# Patient Record
(demographics unavailable — no encounter records)

---

## 2024-10-15 NOTE — HISTORY OF PRESENT ILLNESS
[de-identified] : The patient is a 59 year  old right hand dominant female who presents today complaining of left shoulder pain.   Date of Injury/Onset: 8/15/23 Pain:    At Rest: 3/10  With Activity:  5/10  Mechanism of injury: started experiencing pain after doing upper body exercises at the gym This is not a Work Related Injury being treated under Worker's Compensation. This is not an athletic injury occurring associated with an interscholastic or organized sports team. 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 She also states that pain has improved since PT tx, she only required Naproxen 3 days and has not taken it since.  Additional Information: Currently taking amoxicillin and omeprazol for H pilory tx

## 2024-10-15 NOTE — HISTORY OF PRESENT ILLNESS
[de-identified] : The patient is a 59 year  old right hand dominant female who presents today complaining of left shoulder pain.   Date of Injury/Onset: 8/15/23 Pain:    At Rest: 3/10  With Activity:  5/10  Mechanism of injury: started experiencing pain after doing upper body exercises at the gym This is not a Work Related Injury being treated under Worker's Compensation. This is not an athletic injury occurring associated with an interscholastic or organized sports team. 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 She also states that pain has improved since PT tx, she only required Naproxen 3 days and has not taken it since.  Additional Information: Currently taking amoxicillin and omeprazol for H pilory tx

## 2024-10-15 NOTE — HISTORY OF PRESENT ILLNESS
[FreeTextEntry1] : 58yo female with refractory hpylori  She has refractory hpylori and also has had issues tolerating hpylori regimens  She now on amoxicillin rifabutin, omeprazole for a few days and tolerating relatively well She had minor itching but no rash

## 2024-10-15 NOTE — IMAGING
[de-identified] : The patient is a well appearing 58 year  old female of their stated age.  Neck is supple & nontender to palpation. Negative Spurling's test.   Effected Shoulder: LEFT  Inspection:  Scapula Winging: Negative  Deformity: None  Erythema: None  Ecchymosis: None  Abrasions: None  Effusion: None   Range of Motion:  Active Forward Flexion: 170 degrees   Active Abduction: 170 degrees   Passive Forward Flexion: 170 degrees   Passive Abduction: 170 degrees   ER @ 90 degrees: 90 degrees  IR @ 90 degrees: 15 degrees WITH PAIN ER @ 0 degrees: 30 degrees  Motor Exam:   Forward Flexion: 5- out of 5   Flexion Plane of Scapula: 5- out of 5  Abduction: 5- out of 5  Internal Rotation: 5- out of 5  External Rotation: 5-- out of 5  Distal Motor Strength: 5 out of 5   Stability Testing:  Anterior: 1+  Posterior: 1+  Sulcus N: 1+  Sulcus ER: 1+  Provocative Tests:  Drop Arm: Negative  Impingement: POSITIVE  [place]: Negative  X-Arm Adduction: Negative  Belly Press: Negative  Bear Hug: Negative  Lift Off: Negative  Apprehension: Negative  Relocation: Negative  Posterior Load & Shift: Negative   Palpation:  AC Joint: Nontender  Clavicle: Nontender  SC Joint: Nontender  Bicepital Groove: Nontender  Coracoid Process: Nontender  Pectoralis Minor Tendon: Nontender  Pectoralis Major Tendon: Nontender & palpably intact  Latissimus Dorsi: Nontender   Proximal Humerus: Nontender  Scapula Body: Nontender  Medial Scapula Boarder: Nontender  Scapula Spine: Nontender   Neurologic Exam: Sensation to Light Touch:  Axillary: Grossly intact  Ulnar: Grossly intact  Radial: Grossly intact  Median: Grossly intact  Other:  N/A  Circulatory/Pulses:  Ulnar: 2+  Radial: 2+  Other Pertinent Findings: None   Contralateral Shoulder  Range of Motion:  Active Forward Flexion: 180 degrees   Active Abduction: 180 degrees   Passive Forward Flexion: 180 degrees  Passive Abduction: 180 degrees   ER @ 90 degrees: 90 degrees  IR @ 90 degrees: 45 degrees  ER @ 0 degrees: 50 degrees  Motor Exam:  Forward Flexion: 5 out of 5  Flexion Plane of Scapula: 5 out of 5  Abduction: 5 out of 5  Internal Rotation: 5 out of 5  External Rotation: 5 out of 5  Distal Motor Strength: 5 out of 5  Stability Testing:  Anterior: 1+  Posterior: 1+  Sulcus N: 1+  Sulcus ER: 1+  Other Pertinent Findings: None   Assessment: The patient is a 58 year old female with left shoulder pain and radiographic and physical exam findings consistent with calcific tendonitis and partial rotator cuff tear.    The patients condition is acute  Documents/Results Reviewed Today: None   Tests/Studies Independently Interpreted Today: None  Pertinent findings include:  170/170/90/15/30. 5-/5 throughout, +impingement Confounding medical conditions/concerns: None  Plan: The patient reports gradual, interval improvement in pain and mechanical symptoms related to calcific tendonitis and partial rotator cuff tear through formal rehab and oral NSAIDs. She decides to defer any operative management and will continue with current course. If any pain is worsening, she may consider repeat injection vs arthroscopy. In the interim, continue physical therapy, HEP, and stretching. Continue use of previously prescribed Naproxen 500mg for pain management and inflammation - use as directed and take with food. Modify activity as discussed.  Tests Ordered: None   Prescription Medications Ordered: Continue Naproxen 500mg  Braces/DME Ordered: None  Activity/Work/Sports Status: Limit heavy lifting, as tolerated  Additional Instructions: None Follow-Up: 4 weeks   The patient's current medication management of their orthopedic diagnosis was reviewed today: (1) The patient will continue with the PRN use of their prescribed anti-inflammatory. (2) There is a moderate risk of morbidity with further treatment, especially from use of prescription strength medications and possible side effects of these medications which include upset stomach with oral medications, skin reactions to topical medications and cardiac/renal issues with long term use. (3) I recommended that the patient follow-up with their medical physician to discuss any significant specific potential issues with long term medication use such as interactions with current medications or with exacerbation of underlying medical comorbidities. (4) The benefits and risks associated with use of injectable, oral or topical, prescription and over the counter anti-inflammatory medications were discussed with the patient. The patient voiced understanding of the risks including but not limited to bleeding, stroke, kidney dysfunction, heart disease, and were referred to the black box warning label for further information.   IHemalatha attest that this documentation has been prepared under the direction and in the presence of Provider Dr. Riki Mccormack.   The documentation recorded by the scribe accurately reflects the service JAMES Metzger PA-C personally performed and the decisions made by me. The patient was seen by me under the direct supervision of Dr. Riki Mccormack

## 2024-10-15 NOTE — ASSESSMENT
[FreeTextEntry1] : 58yo female with refractory hpylori  will complete course of current therapy will then check stool for hpylori

## 2024-10-15 NOTE — IMAGING
[de-identified] : The patient is a well appearing 58 year  old female of their stated age.  Neck is supple & nontender to palpation. Negative Spurling's test.   Effected Shoulder: LEFT  Inspection:  Scapula Winging: Negative  Deformity: None  Erythema: None  Ecchymosis: None  Abrasions: None  Effusion: None   Range of Motion:  Active Forward Flexion: 170 degrees   Active Abduction: 170 degrees   Passive Forward Flexion: 170 degrees   Passive Abduction: 170 degrees   ER @ 90 degrees: 90 degrees  IR @ 90 degrees: 15 degrees WITH PAIN ER @ 0 degrees: 30 degrees  Motor Exam:   Forward Flexion: 5- out of 5   Flexion Plane of Scapula: 5- out of 5  Abduction: 5- out of 5  Internal Rotation: 5- out of 5  External Rotation: 5-- out of 5  Distal Motor Strength: 5 out of 5   Stability Testing:  Anterior: 1+  Posterior: 1+  Sulcus N: 1+  Sulcus ER: 1+  Provocative Tests:  Drop Arm: Negative  Impingement: POSITIVE  [place]: Negative  X-Arm Adduction: Negative  Belly Press: Negative  Bear Hug: Negative  Lift Off: Negative  Apprehension: Negative  Relocation: Negative  Posterior Load & Shift: Negative   Palpation:  AC Joint: Nontender  Clavicle: Nontender  SC Joint: Nontender  Bicepital Groove: Nontender  Coracoid Process: Nontender  Pectoralis Minor Tendon: Nontender  Pectoralis Major Tendon: Nontender & palpably intact  Latissimus Dorsi: Nontender   Proximal Humerus: Nontender  Scapula Body: Nontender  Medial Scapula Boarder: Nontender  Scapula Spine: Nontender   Neurologic Exam: Sensation to Light Touch:  Axillary: Grossly intact  Ulnar: Grossly intact  Radial: Grossly intact  Median: Grossly intact  Other:  N/A  Circulatory/Pulses:  Ulnar: 2+  Radial: 2+  Other Pertinent Findings: None   Contralateral Shoulder  Range of Motion:  Active Forward Flexion: 180 degrees   Active Abduction: 180 degrees   Passive Forward Flexion: 180 degrees  Passive Abduction: 180 degrees   ER @ 90 degrees: 90 degrees  IR @ 90 degrees: 45 degrees  ER @ 0 degrees: 50 degrees  Motor Exam:  Forward Flexion: 5 out of 5  Flexion Plane of Scapula: 5 out of 5  Abduction: 5 out of 5  Internal Rotation: 5 out of 5  External Rotation: 5 out of 5  Distal Motor Strength: 5 out of 5  Stability Testing:  Anterior: 1+  Posterior: 1+  Sulcus N: 1+  Sulcus ER: 1+  Other Pertinent Findings: None   Assessment: The patient is a 58 year old female with left shoulder pain and radiographic and physical exam findings consistent with calcific tendonitis and partial rotator cuff tear.    The patients condition is acute  Documents/Results Reviewed Today: None   Tests/Studies Independently Interpreted Today: None  Pertinent findings include:  170/170/90/15/30. 5-/5 throughout, +impingement Confounding medical conditions/concerns: None  Plan: The patient reports gradual, interval improvement in pain and mechanical symptoms related to calcific tendonitis and partial rotator cuff tear through formal rehab and oral NSAIDs. She decides to defer any operative management and will continue with current course. If any pain is worsening, she may consider repeat injection vs arthroscopy. In the interim, continue physical therapy, HEP, and stretching. Continue use of previously prescribed Naproxen 500mg for pain management and inflammation - use as directed and take with food. Modify activity as discussed.  Tests Ordered: None   Prescription Medications Ordered: Continue Naproxen 500mg  Braces/DME Ordered: None  Activity/Work/Sports Status: Limit heavy lifting, as tolerated  Additional Instructions: None Follow-Up: 4 weeks   The patient's current medication management of their orthopedic diagnosis was reviewed today: (1) The patient will continue with the PRN use of their prescribed anti-inflammatory. (2) There is a moderate risk of morbidity with further treatment, especially from use of prescription strength medications and possible side effects of these medications which include upset stomach with oral medications, skin reactions to topical medications and cardiac/renal issues with long term use. (3) I recommended that the patient follow-up with their medical physician to discuss any significant specific potential issues with long term medication use such as interactions with current medications or with exacerbation of underlying medical comorbidities. (4) The benefits and risks associated with use of injectable, oral or topical, prescription and over the counter anti-inflammatory medications were discussed with the patient. The patient voiced understanding of the risks including but not limited to bleeding, stroke, kidney dysfunction, heart disease, and were referred to the black box warning label for further information.   IHemalatha attest that this documentation has been prepared under the direction and in the presence of Provider Dr. Riki Mccormack.   The documentation recorded by the scribe accurately reflects the service JAMES Metzger PA-C personally performed and the decisions made by me. The patient was seen by me under the direct supervision of Dr. Riki Mccormack

## 2024-11-26 NOTE — HISTORY OF PRESENT ILLNESS
[de-identified] : The patient is a 59 year  old right hand dominant female who presents today complaining of left shoulder pain.   Date of Injury/Onset: 8/15/23 Pain:    At Rest: 0/10  With Activity:  5/10  Mechanism of injury: started experiencing pain after doing upper body exercises at the gym This is not a Work Related Injury being treated under Worker's Compensation. This is not an athletic injury occurring associated with an interscholastic or organized sports team. 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 Some pain with OH motions, lateral raises. In PT 1-2x/week. Not interested in CSI.  Additional Information: Currently taking amoxicillin and omeprazol for H angelory tx

## 2024-11-26 NOTE — IMAGING
[de-identified] : The patient is a well appearing 59 year old female of their stated age.  Neck is supple & nontender to palpation. Negative Spurling's test.   Effected Shoulder: LEFT  Inspection:  Scapula Winging: Negative  Deformity: None  Erythema: None  Ecchymosis: None  Abrasions: None  Effusion: None   Range of Motion:  Active Forward Flexion: 180 degrees   Active Abduction: 180 degrees   Passive Forward Flexion: 180 degrees   Passive Abduction: 180 degrees   ER @ 90 degrees: 90 degrees  IR @ 90 degrees: 0 degrees WITH PAIN ER @ 0 degrees: 30 degrees  Motor Exam:   Forward Flexion: 5- out of 5   Flexion Plane of Scapula: 5- out of 5  Abduction: 5- out of 5  Internal Rotation: 5- out of 5  External Rotation: 5- out of 5  Distal Motor Strength: 5 out of 5   Stability Testing:  Anterior: 1+  Posterior: 1+  Sulcus N: 1+  Sulcus ER: 1+  Provocative Tests:  Drop Arm: Negative  Impingement: POSITIVE  [place]: Negative  X-Arm Adduction: Negative  Belly Press: Negative  Bear Hug: Negative  Lift Off: Negative  Apprehension: Negative  Relocation: Negative  Posterior Load & Shift: Negative   Palpation:  AC Joint: Nontender  Clavicle: Nontender  SC Joint: Nontender  Bicepital Groove: Nontender  Coracoid Process: Nontender  Pectoralis Minor Tendon: Nontender  Pectoralis Major Tendon: Nontender & palpably intact  Latissimus Dorsi: Nontender   Proximal Humerus: Nontender  Scapula Body: Nontender  Medial Scapula Boarder: Nontender  Scapula Spine: Nontender   Neurologic Exam: Sensation to Light Touch:  Axillary: Grossly intact  Ulnar: Grossly intact  Radial: Grossly intact  Median: Grossly intact  Other:  N/A  Circulatory/Pulses:  Ulnar: 2+  Radial: 2+  Other Pertinent Findings: None   Contralateral Shoulder  Range of Motion:  Active Forward Flexion: 180 degrees   Active Abduction: 180 degrees   Passive Forward Flexion: 180 degrees  Passive Abduction: 180 degrees   ER @ 90 degrees: 90 degrees  IR @ 90 degrees: 45 degrees  ER @ 0 degrees: 50 degrees  Motor Exam:  Forward Flexion: 5 out of 5  Flexion Plane of Scapula: 5 out of 5  Abduction: 5 out of 5  Internal Rotation: 5 out of 5  External Rotation: 5 out of 5  Distal Motor Strength: 5 out of 5  Stability Testing:  Anterior: 1+  Posterior: 1+  Sulcus N: 1+  Sulcus ER: 1+  Other Pertinent Findings: None   Assessment: The patient is a 59 year old female with left shoulder pain and radiographic and physical exam findings consistent with calcific tendonitis and partial rotator cuff tear.    The patients condition is acute  Documents/Results Reviewed Today: None   Tests/Studies Independently Interpreted Today: None  Pertinent findings include: 180/180/90/0/30. 5-/5 throughout, +impingement Confounding medical conditions/concerns: None  Plan: Discussed all possible treatment options for calcific tendonitis and partial rotator cuff tearing, both operative vs non-operative. Although the patient has been unable to get into formal physical therapy secondary to social life complications, we recommended she remain compliant with HEP to avoid growing stiff. Continue physical therapy, get in as much as she can. Work on strengthening within the range that is comfortable to her. Discussed the benefits of subacromial CSI however, the patient declines. Continue use of previously prescribed Naproxen 500mg for pain management and inflammation - use as directed and take with food. If her range of motion does not improve, she should consider a CSI. Modify activity as discussed. Tests Ordered: None   Prescription Medications Ordered: Continue Naproxen 500mg  Braces/DME Ordered: None  Activity/Work/Sports Status: Limit heavy lifting, as tolerated  Additional Instructions: None Follow-Up: 4 weeks   The patient's current medication management of their orthopedic diagnosis was reviewed today: (1) The patient will continue with the PRN use of their prescribed anti-inflammatory. (2) There is a moderate risk of morbidity with further treatment, especially from use of prescription strength medications and possible side effects of these medications which include upset stomach with oral medications, skin reactions to topical medications and cardiac/renal issues with long term use. (3) I recommended that the patient follow-up with their medical physician to discuss any significant specific potential issues with long term medication use such as interactions with current medications or with exacerbation of underlying medical comorbidities. (4) The benefits and risks associated with use of injectable, oral or topical, prescription and over the counter anti-inflammatory medications were discussed with the patient. The patient voiced understanding of the risks including but not limited to bleeding, stroke, kidney dysfunction, heart disease, and were referred to the black box warning label for further information.   IHemalatha attest that this documentation has been prepared under the direction and in the presence of Provider Dr. Riki Mccormack.   The documentation recorded by the scribe accurately reflects the service I Behzad Metzger PA-C personally performed and the decisions made by me. The patient was seen by me under the direct supervision of Dr. Riki Mccormack

## 2024-11-26 NOTE — IMAGING
[de-identified] : The patient is a well appearing 59 year old female of their stated age.  Neck is supple & nontender to palpation. Negative Spurling's test.   Effected Shoulder: LEFT  Inspection:  Scapula Winging: Negative  Deformity: None  Erythema: None  Ecchymosis: None  Abrasions: None  Effusion: None   Range of Motion:  Active Forward Flexion: 180 degrees   Active Abduction: 180 degrees   Passive Forward Flexion: 180 degrees   Passive Abduction: 180 degrees   ER @ 90 degrees: 90 degrees  IR @ 90 degrees: 0 degrees WITH PAIN ER @ 0 degrees: 30 degrees  Motor Exam:   Forward Flexion: 5- out of 5   Flexion Plane of Scapula: 5- out of 5  Abduction: 5- out of 5  Internal Rotation: 5- out of 5  External Rotation: 5- out of 5  Distal Motor Strength: 5 out of 5   Stability Testing:  Anterior: 1+  Posterior: 1+  Sulcus N: 1+  Sulcus ER: 1+  Provocative Tests:  Drop Arm: Negative  Impingement: POSITIVE  [place]: Negative  X-Arm Adduction: Negative  Belly Press: Negative  Bear Hug: Negative  Lift Off: Negative  Apprehension: Negative  Relocation: Negative  Posterior Load & Shift: Negative   Palpation:  AC Joint: Nontender  Clavicle: Nontender  SC Joint: Nontender  Bicepital Groove: Nontender  Coracoid Process: Nontender  Pectoralis Minor Tendon: Nontender  Pectoralis Major Tendon: Nontender & palpably intact  Latissimus Dorsi: Nontender   Proximal Humerus: Nontender  Scapula Body: Nontender  Medial Scapula Boarder: Nontender  Scapula Spine: Nontender   Neurologic Exam: Sensation to Light Touch:  Axillary: Grossly intact  Ulnar: Grossly intact  Radial: Grossly intact  Median: Grossly intact  Other:  N/A  Circulatory/Pulses:  Ulnar: 2+  Radial: 2+  Other Pertinent Findings: None   Contralateral Shoulder  Range of Motion:  Active Forward Flexion: 180 degrees   Active Abduction: 180 degrees   Passive Forward Flexion: 180 degrees  Passive Abduction: 180 degrees   ER @ 90 degrees: 90 degrees  IR @ 90 degrees: 45 degrees  ER @ 0 degrees: 50 degrees  Motor Exam:  Forward Flexion: 5 out of 5  Flexion Plane of Scapula: 5 out of 5  Abduction: 5 out of 5  Internal Rotation: 5 out of 5  External Rotation: 5 out of 5  Distal Motor Strength: 5 out of 5  Stability Testing:  Anterior: 1+  Posterior: 1+  Sulcus N: 1+  Sulcus ER: 1+  Other Pertinent Findings: None   Assessment: The patient is a 59 year old female with left shoulder pain and radiographic and physical exam findings consistent with calcific tendonitis and partial rotator cuff tear.    The patients condition is acute  Documents/Results Reviewed Today: None   Tests/Studies Independently Interpreted Today: None  Pertinent findings include: 180/180/90/0/30. 5-/5 throughout, +impingement Confounding medical conditions/concerns: None  Plan: Discussed all possible treatment options for calcific tendonitis and partial rotator cuff tearing, both operative vs non-operative. Although the patient has been unable to get into formal physical therapy secondary to social life complications, we recommended she remain compliant with HEP to avoid growing stiff. Continue physical therapy, get in as much as she can. Work on strengthening within the range that is comfortable to her. Discussed the benefits of subacromial CSI however, the patient declines. Continue use of previously prescribed Naproxen 500mg for pain management and inflammation - use as directed and take with food. If her range of motion does not improve, she should consider a CSI. Modify activity as discussed. Tests Ordered: None   Prescription Medications Ordered: Continue Naproxen 500mg  Braces/DME Ordered: None  Activity/Work/Sports Status: Limit heavy lifting, as tolerated  Additional Instructions: None Follow-Up: 4 weeks   The patient's current medication management of their orthopedic diagnosis was reviewed today: (1) The patient will continue with the PRN use of their prescribed anti-inflammatory. (2) There is a moderate risk of morbidity with further treatment, especially from use of prescription strength medications and possible side effects of these medications which include upset stomach with oral medications, skin reactions to topical medications and cardiac/renal issues with long term use. (3) I recommended that the patient follow-up with their medical physician to discuss any significant specific potential issues with long term medication use such as interactions with current medications or with exacerbation of underlying medical comorbidities. (4) The benefits and risks associated with use of injectable, oral or topical, prescription and over the counter anti-inflammatory medications were discussed with the patient. The patient voiced understanding of the risks including but not limited to bleeding, stroke, kidney dysfunction, heart disease, and were referred to the black box warning label for further information.   IHemalatha attest that this documentation has been prepared under the direction and in the presence of Provider Dr. Riki Mccormack.   The documentation recorded by the scribe accurately reflects the service I Behzad Metzger PA-C personally performed and the decisions made by me. The patient was seen by me under the direct supervision of Dr. Riki Mccormack

## 2024-11-26 NOTE — HISTORY OF PRESENT ILLNESS
[de-identified] : The patient is a 59 year  old right hand dominant female who presents today complaining of left shoulder pain.   Date of Injury/Onset: 8/15/23 Pain:    At Rest: 0/10  With Activity:  5/10  Mechanism of injury: started experiencing pain after doing upper body exercises at the gym This is not a Work Related Injury being treated under Worker's Compensation. This is not an athletic injury occurring associated with an interscholastic or organized sports team. 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 Some pain with OH motions, lateral raises. In PT 1-2x/week. Not interested in CSI.  Additional Information: Currently taking amoxicillin and omeprazol for H angelory tx

## 2025-01-06 NOTE — HISTORY OF PRESENT ILLNESS
[de-identified] : The patient is a 59 year  old right hand dominant female who presents today complaining of left shoulder pain.   Date of Injury/Onset: 8/15/23 Pain:    At Rest: 0/10  With Activity:  2/10  Mechanism of injury: started experiencing pain after doing upper body exercises at the gym This is not a Work Related Injury being treated under Worker's Compensation. This is not an athletic injury occurring associated with an interscholastic or organized sports team. 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 Some pain with OH motions, lateral raises. In PT 1-2x/week. Not interested in CSI.  Additional Information: Currently taking amoxicillin and omeprazol for H angelory tx

## 2025-01-06 NOTE — IMAGING
[de-identified] : The patient is a well appearing 59 year old female of their stated age.  Neck is supple & nontender to palpation. Negative Spurling's test.   Effected Shoulder: LEFT  Inspection:  Scapula Winging: Negative  Deformity: None  Erythema: None  Ecchymosis: None  Abrasions: None  Effusion: None   Range of Motion:  Active Forward Flexion: 180 degrees   Active Abduction: 180 degrees   Passive Forward Flexion: 180 degrees   Passive Abduction: 180 degrees   ER @ 90 degrees: 90 degrees  IR @ 90 degrees: 0 degrees WITH PAIN ER @ 0 degrees: 30 degrees  Motor Exam:   Forward Flexion: 5- out of 5   Flexion Plane of Scapula: 5- out of 5  Abduction: 5- out of 5  Internal Rotation: 5 out of 5  External Rotation: 5 out of 5  Distal Motor Strength: 5 out of 5   Stability Testing:  Anterior: 1+  Posterior: 1+  Sulcus N: 1+  Sulcus ER: 1+  Provocative Tests:  Drop Arm: Negative  Impingement: POSITIVE  [place]: Negative  X-Arm Adduction: Negative  Belly Press: Negative  Bear Hug: Negative  Lift Off: Negative  Apprehension: Negative  Relocation: Negative  Posterior Load & Shift: Negative   Palpation:  AC Joint: Nontender  Clavicle: Nontender  SC Joint: Nontender  Bicepital Groove: Nontender  Coracoid Process: Nontender  Pectoralis Minor Tendon: Nontender  Pectoralis Major Tendon: Nontender & palpably intact  Latissimus Dorsi: Nontender   Proximal Humerus: Nontender  Scapula Body: Nontender  Medial Scapula Boarder: Nontender  Scapula Spine: Nontender   Neurologic Exam: Sensation to Light Touch:  Axillary: Grossly intact  Ulnar: Grossly intact  Radial: Grossly intact  Median: Grossly intact  Other:  N/A  Circulatory/Pulses:  Ulnar: 2+  Radial: 2+  Other Pertinent Findings: None   Contralateral Shoulder  Range of Motion:  Active Forward Flexion: 180 degrees   Active Abduction: 180 degrees   Passive Forward Flexion: 180 degrees  Passive Abduction: 180 degrees   ER @ 90 degrees: 90 degrees  IR @ 90 degrees: 45 degrees  ER @ 0 degrees: 50 degrees  Motor Exam:  Forward Flexion: 5 out of 5  Flexion Plane of Scapula: 5 out of 5  Abduction: 5 out of 5  Internal Rotation: 5 out of 5  External Rotation: 5 out of 5  Distal Motor Strength: 5 out of 5  Stability Testing:  Anterior: 1+  Posterior: 1+  Sulcus N: 1+  Sulcus ER: 1+  Other Pertinent Findings: None   Assessment: The patient is a 59 year old female with left shoulder pain and radiographic and physical exam findings consistent with calcific tendonitis and partial rotator cuff tear.    The patients condition is acute  Documents/Results Reviewed Today: None   Tests/Studies Independently Interpreted Today: None  Pertinent findings include: 180/180/90/0/30. 5-/5 ABD, FF, and FSP, 5/5 IR & ER, +impingement Confounding medical conditions/concerns: None  Plan: Although the patient's condition has slightly improved, she presents with residual symptomology consistent with calcific tendonitis and impingement. At this time, her rotator cuff strength is intact and discomfort appears more related to calcific tendinopathy. Again, we discussed all possible treatment options for calcific tendonitis and partial rotator cuff tearing, both operative vs non-operative. Continue physical therapy. We discussed the benefits of a subacromial CSI to cleave inflammatory process however, the patient decides to defer. We recommended she consider if no better in 6 weeks. Continue use of previously prescribed Naproxen 500mg for pain management and inflammation - use as directed and take with food. Modify activity as discussed. Tests Ordered: None   Prescription Medications Ordered: Continue PRN use of Naproxen 500mg  Braces/DME Ordered: None  Activity/Work/Sports Status: As tolerated  Additional Instructions: None Follow-Up: 4-6 weeks   The patient's current medication management of their orthopedic diagnosis was reviewed today: (1) The patient will continue with the PRN use of their prescribed anti-inflammatory. (2) There is a moderate risk of morbidity with further treatment, especially from use of prescription strength medications and possible side effects of these medications which include upset stomach with oral medications, skin reactions to topical medications and cardiac/renal issues with long term use. (3) I recommended that the patient follow-up with their medical physician to discuss any significant specific potential issues with long term medication use such as interactions with current medications or with exacerbation of underlying medical comorbidities. (4) The benefits and risks associated with use of injectable, oral or topical, prescription and over the counter anti-inflammatory medications were discussed with the patient. The patient voiced understanding of the risks including but not limited to bleeding, stroke, kidney dysfunction, heart disease, and were referred to the black box warning label for further information.   I, Hemalatha Lucia attest that this documentation has been prepared under the direction and in the presence of Provider Dr. Riki Mccormack.   The documentation recorded by the scribe accurately reflects the service I Behzad Metzger PA-C personally performed and the decisions made by me. The patient was seen by me under the direct supervision of Dr. Riki Mccormack

## 2025-01-19 NOTE — PHYSICAL EXAM
[Alert] : alert [Normal Voice/Communication] : normal voice/communication [Healthy Appearing] : healthy appearing [No Acute Distress] : no acute distress [Sclera] : the sclera and conjunctiva were normal [Hearing Threshold Finger Rub Not [place]] : hearing was normal [Normal Lips/Gums] : the lips and gums were normal [Oropharynx] : the oropharynx was normal [Normal Appearance] : the appearance of the neck was normal [No Neck Mass] : no neck mass was observed [No Respiratory Distress] : no respiratory distress [No Acc Muscle Use] : no accessory muscle use [Respiration, Rhythm And Depth] : normal respiratory rhythm and effort [Auscultation Breath Sounds / Voice Sounds] : lungs were clear to auscultation bilaterally [Heart Rate And Rhythm] : heart rate was normal and rhythm regular [Normal S1, S2] : normal S1 and S2 [Murmurs] : no murmurs [Bowel Sounds] : normal bowel sounds [Abdomen Tenderness] : non-tender [No Masses] : no abdominal mass palpated [Abdomen Soft] : soft [] : no hepatosplenomegaly [Oriented To Time, Place, And Person] : oriented to person, place, and time

## 2025-01-19 NOTE — ASSESSMENT
[FreeTextEntry1] : 60yo female with hx hpylori infection, also with dilated CBD  outside imaging reviewed ultrasound with 8 mm CBD will check repeat sono to see if persistent or larger check MRCP if CBD larger  will speak to lab to see how to get media for EGD with biopsy to do c and s for hpylori will check egd

## 2025-01-19 NOTE — HISTORY OF PRESENT ILLNESS
[FreeTextEntry1] : 58yo female with hpylori  She has failed treatment - both ineffective in eliminating Hpylori but she also has difficulty tolerating several of the antibiotics, not true allergies but more intolerance  We have discussed checking EGD with culture - for financial reasons, she is unable to have done at  We have to explore how to get proper culture media for procedure outside   She has sono with 8mm CBD in the past and currently no focal RUQ pain

## 2025-01-19 NOTE — ASSESSMENT
[FreeTextEntry1] : 58yo female with hx hpylori infection, also with dilated CBD  outside imaging reviewed ultrasound with 8 mm CBD will check repeat sono to see if persistent or larger check MRCP if CBD larger  will speak to lab to see how to get media for EGD with biopsy to do c and s for hpylori will check egd

## 2025-01-19 NOTE — HISTORY OF PRESENT ILLNESS
[FreeTextEntry1] : 60yo female with hpylori  She has failed treatment - both ineffective in eliminating Hpylori but she also has difficulty tolerating several of the antibiotics, not true allergies but more intolerance  We have discussed checking EGD with culture - for financial reasons, she is unable to have done at  We have to explore how to get proper culture media for procedure outside   She has sono with 8mm CBD in the past and currently no focal RUQ pain

## 2025-02-10 NOTE — PROCEDURE
[de-identified] : Indication:  Unable to adequately examine nasal passages and sinus drainage with anterior rhinoscopy. The patient has nasal dryness slight nose bleeding  Scope # 47 Mild septal deviation is present on direct visualization on either side. Both inferior nasal turbinates are moderate in size with normal  appearing mucosa.  The sinus endoscope was introduced into the right nares exam right middle meatus reveals no mucopus, polyps or inflammation.  The middle turbinate is unremarkable. The scope was advanced and the sphenoethmoid region was inspected. The superior meatus and nasal vault are unremarkable.  The nasopharynx is unremarkable without inflammation or mass The sinus endoscope was introduced into the left nares exam of the left middle meatus reveals no mucopus, polyps or inflammation and the left middle turbinate is unremarkable. The scope was advanced and the sphenoethmoid region was inspected. The left superior meatus and nasal vault are unremarkable.

## 2025-02-10 NOTE — ASSESSMENT
[FreeTextEntry1] : rhinitis sicca audio au loss mild asymmetry f/u audio one y ponaris emollient hypertonic saline rinses

## 2025-02-10 NOTE — DATA REVIEWED
[de-identified] :  Type A tymps, AU. Testing via inserts: AD- WNL up to 1kHz, sloping to a mild / moderate SNHL, rising to WNL. AS- WNL up to 2kHz, sloping to a mild to moderate SNHL. Recs: 1) F/u w/ MD 2) Further tests as per MD 3) Hearing aids pending med clearance 4) Annual

## 2025-02-10 NOTE — HISTORY OF PRESENT ILLNESS
[de-identified] : 59 year old female presents for hearing/ sinus f/u Hx sinus inflammation- Nov 2024, Rx Amoxicillin and Prednisone States nasal dryness and pain in AM d/t cold weather Using nebulizer and saline spray Notices blood tinged mucous - yellow/green Nasal stuffiness when supine Also states hearing troubles in past 3 months Extra effort to hear properly No tinnitus, no vertigo spontaneous

## 2025-02-25 NOTE — HISTORY OF PRESENT ILLNESS
[de-identified] : The patient is a 60 year old right hand dominant female who presents today complaining of left shoulder pain.   Date of Injury/Onset: 8/15/23 Pain:    At Rest: 0/10  With Activity:  2/10  Mechanism of injury: started experiencing pain after doing upper body exercises at the gym This is not a Work Related Injury being treated under Worker's Compensation. This is not an athletic injury occurring associated with an interscholastic or organized sports team. 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 Still getting intermittent pinching pain with lateral movements and increased weighted rehab exercises.  Additional Information: Currently taking amoxicillin and omeprazol for H pilory tx

## 2025-02-25 NOTE — HISTORY OF PRESENT ILLNESS
[de-identified] : The patient is a 60 year old right hand dominant female who presents today complaining of left shoulder pain.   Date of Injury/Onset: 8/15/23 Pain:    At Rest: 0/10  With Activity:  2/10  Mechanism of injury: started experiencing pain after doing upper body exercises at the gym This is not a Work Related Injury being treated under Worker's Compensation. This is not an athletic injury occurring associated with an interscholastic or organized sports team. 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 Still getting intermittent pinching pain with lateral movements and increased weighted rehab exercises.  Additional Information: Currently taking amoxicillin and omeprazol for H pilory tx

## 2025-02-25 NOTE — IMAGING
[de-identified] : The patient is a well appearing 59 year old female of their stated age.  Neck is supple & nontender to palpation. Negative Spurling's test.   Effected Shoulder: LEFT  Inspection:  Scapula Winging: Negative  Deformity: None  Erythema: None  Ecchymosis: None  Abrasions: None  Effusion: None   Range of Motion:  Active Forward Flexion: 180 degrees   Active Abduction: 180 degrees   Passive Forward Flexion: 180 degrees   Passive Abduction: 180 degrees   ER @ 90 degrees: 90 degrees  IR @ 90 degrees: 0 degrees WITH PAIN ER @ 0 degrees: 30 degrees  Motor Exam:   Forward Flexion: 5- out of 5   Flexion Plane of Scapula: 5- out of 5  Abduction: 5- out of 5  Internal Rotation: 5 out of 5  External Rotation: 5 out of 5  Distal Motor Strength: 5 out of 5   Stability Testing:  Anterior: 1+  Posterior: 1+  Sulcus N: 1+  Sulcus ER: 1+  Provocative Tests:  Drop Arm: Negative  Impingement: POSITIVE  [place]: Negative  X-Arm Adduction: Negative  Belly Press: Negative  Bear Hug: Negative  Lift Off: Negative  Apprehension: Negative  Relocation: Negative  Posterior Load & Shift: Negative   Palpation:  AC Joint: Nontender  Clavicle: Nontender  SC Joint: Nontender  Bicepital Groove: Nontender  Coracoid Process: Nontender  Pectoralis Minor Tendon: Nontender  Pectoralis Major Tendon: Nontender & palpably intact  Latissimus Dorsi: Nontender   Proximal Humerus: Nontender  Scapula Body: Nontender  Medial Scapula Boarder: Nontender  Scapula Spine: Nontender   Neurologic Exam: Sensation to Light Touch:  Axillary: Grossly intact  Ulnar: Grossly intact  Radial: Grossly intact  Median: Grossly intact  Other:  N/A  Circulatory/Pulses:  Ulnar: 2+  Radial: 2+  Other Pertinent Findings: None   Contralateral Shoulder  Range of Motion:  Active Forward Flexion: 180 degrees   Active Abduction: 180 degrees   Passive Forward Flexion: 180 degrees  Passive Abduction: 180 degrees   ER @ 90 degrees: 90 degrees  IR @ 90 degrees: 45 degrees  ER @ 0 degrees: 50 degrees  Motor Exam:  Forward Flexion: 5 out of 5  Flexion Plane of Scapula: 5 out of 5  Abduction: 5 out of 5  Internal Rotation: 5 out of 5  External Rotation: 5 out of 5  Distal Motor Strength: 5 out of 5  Stability Testing:  Anterior: 1+  Posterior: 1+  Sulcus N: 1+  Sulcus ER: 1+  Other Pertinent Findings: None   Assessment: The patient is a 59 year old female with left shoulder pain and radiographic and physical exam findings consistent with calcific tendonitis and partial rotator cuff tear.    The patient's condition is acute  Documents/Results Reviewed Today: None   Tests/Studies Independently Interpreted Today: None  Pertinent findings include: 180/180/90/0/30 pain with end ROM, 5-/5 ABD, FF, and FSP, 5/5 IR & ER, +impingement Confounding medical conditions/concerns: None  Plan: The patient reports gradual improvement with symptoms related to calcific tendinitis and partial rotator cuff tear. She is experiencing most of her pain with her end range of motion.  Again, we discussed all possible treatment options for calcific tendonitis and partial rotator cuff tearing, both operative vs non-operative. Continue physical therapy.  Again, we discussed the benefits of a subacromial CSI to cleave inflammatory process however, the patient decides to defer.  Continue Physical Therapy, HEP and stretching. Continue use of previously prescribed Naproxen 500mg for pain management and inflammation - use as directed and take with food. Modify activity as discussed. Tests Ordered: None   Prescription Medications Ordered: Continue PRN use of Naproxen 500mg  Braces/DME Ordered: None  Activity/Work/Sports Status: As tolerated  Additional Instructions: None Follow-Up: 6 weeks  The patient's current medication management of their orthopedic diagnosis was reviewed today: (1) The patient will continue with the PRN use of their prescribed anti-inflammatory. (2) There is a moderate risk of morbidity with further treatment, especially from use of prescription strength medications and possible side effects of these medications which include upset stomach with oral medications, skin reactions to topical medications and cardiac/renal issues with long term use. (3) I recommended that the patient follow-up with their medical physician to discuss any significant specific potential issues with long term medication use such as interactions with current medications or with exacerbation of underlying medical comorbidities. (4) The benefits and risks associated with use of injectable, oral or topical, prescription and over the counter anti-inflammatory medications were discussed with the patient. The patient voiced understanding of the risks including but not limited to bleeding, stroke, kidney dysfunction, heart disease, and were referred to the black box warning label for further information.   I, Andra Gray attest that this documentation has been prepared under the direction and in the presence of Provider Dr. Riki Mccormack.  The documentation recorded by the scribe accurately reflects the services I, Dr. Riki Mccormack, personally performed and the decisions made by me.

## 2025-02-25 NOTE — IMAGING
[de-identified] : The patient is a well appearing 59 year old female of their stated age.  Neck is supple & nontender to palpation. Negative Spurling's test.   Effected Shoulder: LEFT  Inspection:  Scapula Winging: Negative  Deformity: None  Erythema: None  Ecchymosis: None  Abrasions: None  Effusion: None   Range of Motion:  Active Forward Flexion: 180 degrees   Active Abduction: 180 degrees   Passive Forward Flexion: 180 degrees   Passive Abduction: 180 degrees   ER @ 90 degrees: 90 degrees  IR @ 90 degrees: 0 degrees WITH PAIN ER @ 0 degrees: 30 degrees  Motor Exam:   Forward Flexion: 5- out of 5   Flexion Plane of Scapula: 5- out of 5  Abduction: 5- out of 5  Internal Rotation: 5 out of 5  External Rotation: 5 out of 5  Distal Motor Strength: 5 out of 5   Stability Testing:  Anterior: 1+  Posterior: 1+  Sulcus N: 1+  Sulcus ER: 1+  Provocative Tests:  Drop Arm: Negative  Impingement: POSITIVE  [place]: Negative  X-Arm Adduction: Negative  Belly Press: Negative  Bear Hug: Negative  Lift Off: Negative  Apprehension: Negative  Relocation: Negative  Posterior Load & Shift: Negative   Palpation:  AC Joint: Nontender  Clavicle: Nontender  SC Joint: Nontender  Bicepital Groove: Nontender  Coracoid Process: Nontender  Pectoralis Minor Tendon: Nontender  Pectoralis Major Tendon: Nontender & palpably intact  Latissimus Dorsi: Nontender   Proximal Humerus: Nontender  Scapula Body: Nontender  Medial Scapula Boarder: Nontender  Scapula Spine: Nontender   Neurologic Exam: Sensation to Light Touch:  Axillary: Grossly intact  Ulnar: Grossly intact  Radial: Grossly intact  Median: Grossly intact  Other:  N/A  Circulatory/Pulses:  Ulnar: 2+  Radial: 2+  Other Pertinent Findings: None   Contralateral Shoulder  Range of Motion:  Active Forward Flexion: 180 degrees   Active Abduction: 180 degrees   Passive Forward Flexion: 180 degrees  Passive Abduction: 180 degrees   ER @ 90 degrees: 90 degrees  IR @ 90 degrees: 45 degrees  ER @ 0 degrees: 50 degrees  Motor Exam:  Forward Flexion: 5 out of 5  Flexion Plane of Scapula: 5 out of 5  Abduction: 5 out of 5  Internal Rotation: 5 out of 5  External Rotation: 5 out of 5  Distal Motor Strength: 5 out of 5  Stability Testing:  Anterior: 1+  Posterior: 1+  Sulcus N: 1+  Sulcus ER: 1+  Other Pertinent Findings: None   Assessment: The patient is a 59 year old female with left shoulder pain and radiographic and physical exam findings consistent with calcific tendonitis and partial rotator cuff tear.    The patient's condition is acute  Documents/Results Reviewed Today: None   Tests/Studies Independently Interpreted Today: None  Pertinent findings include: 180/180/90/0/30 pain with end ROM, 5-/5 ABD, FF, and FSP, 5/5 IR & ER, +impingement Confounding medical conditions/concerns: None  Plan: The patient reports gradual improvement with symptoms related to calcific tendinitis and partial rotator cuff tear. She is experiencing most of her pain with her end range of motion.  Again, we discussed all possible treatment options for calcific tendonitis and partial rotator cuff tearing, both operative vs non-operative. Continue physical therapy.  Again, we discussed the benefits of a subacromial CSI to cleave inflammatory process however, the patient decides to defer.  Continue Physical Therapy, HEP and stretching. Continue use of previously prescribed Naproxen 500mg for pain management and inflammation - use as directed and take with food. Modify activity as discussed. Tests Ordered: None   Prescription Medications Ordered: Continue PRN use of Naproxen 500mg  Braces/DME Ordered: None  Activity/Work/Sports Status: As tolerated  Additional Instructions: None Follow-Up: 6 weeks  The patient's current medication management of their orthopedic diagnosis was reviewed today: (1) The patient will continue with the PRN use of their prescribed anti-inflammatory. (2) There is a moderate risk of morbidity with further treatment, especially from use of prescription strength medications and possible side effects of these medications which include upset stomach with oral medications, skin reactions to topical medications and cardiac/renal issues with long term use. (3) I recommended that the patient follow-up with their medical physician to discuss any significant specific potential issues with long term medication use such as interactions with current medications or with exacerbation of underlying medical comorbidities. (4) The benefits and risks associated with use of injectable, oral or topical, prescription and over the counter anti-inflammatory medications were discussed with the patient. The patient voiced understanding of the risks including but not limited to bleeding, stroke, kidney dysfunction, heart disease, and were referred to the black box warning label for further information.   I, Andra Gray attest that this documentation has been prepared under the direction and in the presence of Provider Dr. Riki Mccormack.  The documentation recorded by the scribe accurately reflects the services I, Dr. Riki Mccormack, personally performed and the decisions made by me.

## 2025-03-15 NOTE — HISTORY OF PRESENT ILLNESS
[FreeTextEntry1] : 61 yo  presents for annual exam and follow up for stable  multiple fibroid  uterus, Nl endometrial lining 2mm. [Mammogramdate] : 03/24 [TextBox_19] : benign [BreastSonogramDate] : 03/24 [PapSmeardate] : 2024 [ColonoscopyDate] : 2022 [Options Discussed] : options discussed [Vaginal Lubrication] : vaginal lubrication [TextBox_18] : OTC vaginal moisturizers  [Currently Active] : currently active

## 2025-03-15 NOTE — DISCUSSION/SUMMARY
[FreeTextEntry1] : Atrophic vulvovaginitis- DW pt  treatment options both OTC moisturizers, and RX Estradiol cream.  Risks, benefits, side effects, and possible complications reviewed.  Patient expressed understanding about medications, and risks.  I spent the time noted on the day of this patient encounter preparing for, providing and documenting the above service. I have counseled and educated the patient on the differential, workup, disease course, and treatment/management plan. Education was provided to the patient during this encounter. All questions and concerns were answered and addressed in detail.

## 2025-03-15 NOTE — PHYSICAL EXAM
[Chaperone Declined] : Patient declined chaperone [Appropriately responsive] : appropriately responsive [Alert] : alert [No Acute Distress] : no acute distress [No Lymphadenopathy] : no lymphadenopathy [Soft] : soft [Non-tender] : non-tender [Non-distended] : non-distended [No HSM] : No HSM [No Lesions] : no lesions [No Mass] : no mass [Oriented x3] : oriented x3 [Examination Of The Breasts] : a normal appearance [No Discharge] : no discharge [No Masses] : no breast masses were palpable [Labia Majora] : normal [Labia Minora] : normal [Atrophy] : atrophy [No Bleeding] : There was no active vaginal bleeding [Normal] : normal [Uterine Adnexae] : normal [External Hemorrhoid] : external hemorrhoid

## 2025-04-15 NOTE — HISTORY OF PRESENT ILLNESS
[de-identified] : The patient is a 60 year old right hand dominant female who presents today complaining of left shoulder pain.   Date of Injury/Onset: 8/15/23 Pain:    At Rest: 0/10  With Activity:  1-2/10  Mechanism of injury: started experiencing pain after doing upper body exercises at the gym This is not a Work Related Injury being treated under Worker's Compensation. This is not an athletic injury occurring associated with an interscholastic or organized sports team. Quality of symptoms: soreness  Improves with: rest, ice Worse with: lateral raises Treatment/Imaging/Studies Since Last Visit: PT 1-2x/wk Shoshana mccollum in [place]  	Reports Available For Review Today: none Out of work/sport: currently working School/Sport/Position/Occupation: speech therapist Changes since last visit: Feeling much better. In PT 1-2x/week working on strengthening and massage. Some soreness with IR.  Additional Information: Currently taking amoxicillin and omeprazol for H pilory tx

## 2025-04-15 NOTE — HISTORY OF PRESENT ILLNESS
[de-identified] : The patient is a 60 year old right hand dominant female who presents today complaining of left shoulder pain.   Date of Injury/Onset: 8/15/23 Pain:    At Rest: 0/10  With Activity:  1-2/10  Mechanism of injury: started experiencing pain after doing upper body exercises at the gym This is not a Work Related Injury being treated under Worker's Compensation. This is not an athletic injury occurring associated with an interscholastic or organized sports team. 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 In PT 1-2x/week working on strengthening and massage. Some soreness with IR.  Additional Information: Currently taking amoxicillin and omeprazol for H pilory tx

## 2025-04-15 NOTE — IMAGING
[de-identified] : The patient is a well appearing 60-year-old female of their stated age.  Neck is supple & nontender to palpation. Negative Spurling's test.   Effected Shoulder: LEFT  Inspection:  Scapula Winging: Negative  Deformity: None  Erythema: None  Ecchymosis: None  Abrasions: None  Effusion: None   Range of Motion:  Active Forward Flexion: 180 degrees   Active Abduction: 180 degrees   Passive Forward Flexion: 180 degrees   Passive Abduction: 180 degrees   ER @ 90 degrees: 90 degrees  IR @ 90 degrees: 30 degrees  ER @ 0 degrees: 40 degrees  Motor Exam:   Forward Flexion: 5- out of 5   Flexion Plane of Scapula: 5- out of 5  Abduction: 5- out of 5  Internal Rotation: 5 out of 5  External Rotation: 5 out of 5  Distal Motor Strength: 5 out of 5   Stability Testing:  Anterior: 1+  Posterior: 1+  Sulcus N: 1+  Sulcus ER: 1+  Provocative Tests:  Drop Arm: Negative  Impingement: EQUIVOCAL  [place]: Negative  X-Arm Adduction: Negative  Belly Press: Negative  Bear Hug: Negative  Lift Off: Negative  Apprehension: Negative  Relocation: Negative  Posterior Load & Shift: Negative   Palpation:  AC Joint: Nontender  Clavicle: Nontender  SC Joint: Nontender  Bicepital Groove: Nontender  Coracoid Process: Nontender  Pectoralis Minor Tendon: Nontender  Pectoralis Major Tendon: Nontender & palpably intact  Latissimus Dorsi: Nontender   Proximal Humerus: Nontender  Scapula Body: Nontender  Medial Scapula Boarder: Nontender  Scapula Spine: Nontender   Neurologic Exam: Sensation to Light Touch:  Axillary: Grossly intact  Ulnar: Grossly intact  Radial: Grossly intact  Median: Grossly intact  Other:  N/A  Circulatory/Pulses:  Ulnar: 2+  Radial: 2+  Other Pertinent Findings: None   Contralateral Shoulder  Range of Motion:  Active Forward Flexion: 180 degrees   Active Abduction: 180 degrees   Passive Forward Flexion: 180 degrees  Passive Abduction: 180 degrees   ER @ 90 degrees: 90 degrees  IR @ 90 degrees: 45 degrees  ER @ 0 degrees: 50 degrees  Motor Exam:  Forward Flexion: 5 out of 5  Flexion Plane of Scapula: 5 out of 5  Abduction: 5 out of 5  Internal Rotation: 5 out of 5  External Rotation: 5 out of 5  Distal Motor Strength: 5 out of 5  Stability Testing:  Anterior: 1+  Posterior: 1+  Sulcus N: 1+  Sulcus ER: 1+  Other Pertinent Findings: None   Assessment: The patient is a 60 year old female with left shoulder pain and radiographic and physical exam findings consistent with calcific tendonitis and partial rotator cuff tear.    The patient's condition is acute  Documents/Results Reviewed Today: None   Tests/Studies Independently Interpreted Today: None  Pertinent findings include: 180/180/90/30/40, 5-/5 ABD, FF, and FSP, 5/5 IR & ER, equivocal impingement Confounding medical conditions/concerns: None  Plan: The patients condition continues to improve on a gradual, interval basis related to calcific tendinopathy and partial rotator cuff tearing. She will continue physical therapy, transitioning into HEP and strengthening. Again, we discussed the benefits of a subacromial CSI to cleave inflammatory process however, the patient decides to defer. She will consider if her conditon starts to regress. Continue use of previously prescribed Naproxen 500mg for pain management and inflammation - use as directed and take with food. Advance activity as tolerated. Modify activity as discussed. Tests Ordered: None   Prescription Medications Ordered: Continue PRN use of Naproxen 500mg  Braces/DME Ordered: None  Activity/Work/Sports Status: As tolerated  Additional Instructions: None Follow-Up: PRN   The patient's current medication management of their orthopedic diagnosis was reviewed today: (1) The patient will continue with the PRN use of their prescribed anti-inflammatory. (2) There is a moderate risk of morbidity with further treatment, especially from use of prescription strength medications and possible side effects of these medications which include upset stomach with oral medications, skin reactions to topical medications and cardiac/renal issues with long term use. (3) I recommended that the patient follow-up with their medical physician to discuss any significant specific potential issues with long term medication use such as interactions with current medications or with exacerbation of underlying medical comorbidities. (4) The benefits and risks associated with use of injectable, oral or topical, prescription and over the counter anti-inflammatory medications were discussed with the patient. The patient voiced understanding of the risks including but not limited to bleeding, stroke, kidney dysfunction, heart disease, and were referred to the black box warning label for further information.  Hemalatha RAMIREZ attest that this documentation has been prepared under the direction and in the presence of Provider Dr. Riki Mccormack.   The documentation recorded by the scribe accurately reflects the services IDr. Riki, personally performed and the decisions made by me.

## 2025-04-15 NOTE — IMAGING
[de-identified] : The patient is a well appearing 60-year-old female of their stated age.  Neck is supple & nontender to palpation. Negative Spurling's test.   Effected Shoulder: LEFT  Inspection:  Scapula Winging: Negative  Deformity: None  Erythema: None  Ecchymosis: None  Abrasions: None  Effusion: None   Range of Motion:  Active Forward Flexion: 180 degrees   Active Abduction: 180 degrees   Passive Forward Flexion: 180 degrees   Passive Abduction: 180 degrees   ER @ 90 degrees: 90 degrees  IR @ 90 degrees: 30 degrees  ER @ 0 degrees: 40 degrees  Motor Exam:   Forward Flexion: 5- out of 5   Flexion Plane of Scapula: 5- out of 5  Abduction: 5- out of 5  Internal Rotation: 5 out of 5  External Rotation: 5 out of 5  Distal Motor Strength: 5 out of 5   Stability Testing:  Anterior: 1+  Posterior: 1+  Sulcus N: 1+  Sulcus ER: 1+  Provocative Tests:  Drop Arm: Negative  Impingement: EQUIVOCAL  Carbon: Negative  X-Arm Adduction: Negative  Belly Press: Negative  Bear Hug: Negative  Lift Off: Negative  Apprehension: Negative  Relocation: Negative  Posterior Load & Shift: Negative   Palpation:  AC Joint: Nontender  Clavicle: Nontender  SC Joint: Nontender  Bicepital Groove: Nontender  Coracoid Process: Nontender  Pectoralis Minor Tendon: Nontender  Pectoralis Major Tendon: Nontender & palpably intact  Latissimus Dorsi: Nontender   Proximal Humerus: Nontender  Scapula Body: Nontender  Medial Scapula Boarder: Nontender  Scapula Spine: Nontender   Neurologic Exam: Sensation to Light Touch:  Axillary: Grossly intact  Ulnar: Grossly intact  Radial: Grossly intact  Median: Grossly intact  Other:  N/A  Circulatory/Pulses:  Ulnar: 2+  Radial: 2+  Other Pertinent Findings: None   Contralateral Shoulder  Range of Motion:  Active Forward Flexion: 180 degrees   Active Abduction: 180 degrees   Passive Forward Flexion: 180 degrees  Passive Abduction: 180 degrees   ER @ 90 degrees: 90 degrees  IR @ 90 degrees: 45 degrees  ER @ 0 degrees: 50 degrees  Motor Exam:  Forward Flexion: 5 out of 5  Flexion Plane of Scapula: 5 out of 5  Abduction: 5 out of 5  Internal Rotation: 5 out of 5  External Rotation: 5 out of 5  Distal Motor Strength: 5 out of 5  Stability Testing:  Anterior: 1+  Posterior: 1+  Sulcus N: 1+  Sulcus ER: 1+  Other Pertinent Findings: None   Assessment: The patient is a 60 year old female with left shoulder pain and radiographic and physical exam findings consistent with calcific tendonitis and partial rotator cuff tear.    The patient's condition is acute  Documents/Results Reviewed Today: None   Tests/Studies Independently Interpreted Today: None  Pertinent findings include: 180/180/90/30/40, 5-/5 ABD, FF, and FSP, 5/5 IR & ER, equivocal impingement Confounding medical conditions/concerns: None  Plan: The patients condition continues to improve on a gradual, interval basis related to calcific tendinopathy and partial rotator cuff tearing. She will continue physical therapy, transitioning into HEP and strengthening. Again, we discussed the benefits of a subacromial CSI to cleave inflammatory process however, the patient decides to defer. She will consider if her conditon starts to regress. Continue use of previously prescribed Naproxen 500mg for pain management and inflammation - use as directed and take with food. Advance activity as tolerated. Modify activity as discussed. Tests Ordered: None   Prescription Medications Ordered: Continue PRN use of Naproxen 500mg  Braces/DME Ordered: None  Activity/Work/Sports Status: As tolerated  Additional Instructions: None Follow-Up: PRN   The patient's current medication management of their orthopedic diagnosis was reviewed today: (1) The patient will continue with the PRN use of their prescribed anti-inflammatory. (2) There is a moderate risk of morbidity with further treatment, especially from use of prescription strength medications and possible side effects of these medications which include upset stomach with oral medications, skin reactions to topical medications and cardiac/renal issues with long term use. (3) I recommended that the patient follow-up with their medical physician to discuss any significant specific potential issues with long term medication use such as interactions with current medications or with exacerbation of underlying medical comorbidities. (4) The benefits and risks associated with use of injectable, oral or topical, prescription and over the counter anti-inflammatory medications were discussed with the patient. The patient voiced understanding of the risks including but not limited to bleeding, stroke, kidney dysfunction, heart disease, and were referred to the black box warning label for further information.  Hemalatha RAMIREZ attest that this documentation has been prepared under the direction and in the presence of Provider Dr. Riki Mccormack.   The documentation recorded by the scribe accurately reflects the services IDr. Riki, personally performed and the decisions made by me.